# Patient Record
Sex: FEMALE | URBAN - METROPOLITAN AREA
[De-identification: names, ages, dates, MRNs, and addresses within clinical notes are randomized per-mention and may not be internally consistent; named-entity substitution may affect disease eponyms.]

---

## 2021-04-23 ENCOUNTER — OFFICE VISIT (OUTPATIENT)
Dept: URBAN - METROPOLITAN AREA CLINIC 26 | Facility: CLINIC | Age: 76
End: 2021-04-23
Payer: MEDICARE

## 2021-04-23 DIAGNOSIS — Z79.84 LONG TERM (CURRENT) USE OF ORAL HYPOGLYCEMIC DRUGS: ICD-10-CM

## 2021-04-23 DIAGNOSIS — Z96.1 PRESENCE OF PSEUDOPHAKIA: ICD-10-CM

## 2021-04-23 DIAGNOSIS — H04.123 TEAR FILM INSUFFICIENCY OF BILATERAL LACRIMAL GLANDS: ICD-10-CM

## 2021-04-23 DIAGNOSIS — E11.9 TYPE 2 DIABETES MELLITUS W/O COMPLICATION: Primary | ICD-10-CM

## 2021-04-23 DIAGNOSIS — H43.391 OTHER VITREOUS OPACITIES OF RIGHT EYE: ICD-10-CM

## 2021-04-23 PROCEDURE — 92004 COMPRE OPH EXAM NEW PT 1/>: CPT | Performed by: OPTOMETRIST

## 2021-04-23 PROCEDURE — 92134 CPTRZ OPH DX IMG PST SGM RTA: CPT | Performed by: OPTOMETRIST

## 2021-04-23 ASSESSMENT — VISUAL ACUITY
OS: 20/20
OD: 20/20

## 2021-04-23 ASSESSMENT — INTRAOCULAR PRESSURE
OD: 17
OS: 15

## 2021-04-23 ASSESSMENT — KERATOMETRY
OS: 43.38
OD: 43.75

## 2021-04-23 NOTE — IMPRESSION/PLAN
Impression: Type 2 diabetes mellitus w/o complication: E63.2. Plan: No Non-Proliferative Diabetic Retinopathy, no Diabetic Macular Edema and no Neovascularization of the iris, disc, or elsewhere. Discussed ocular and systemic benefits of blood sugar control. Check annually.

## 2022-08-10 ENCOUNTER — OFFICE VISIT (OUTPATIENT)
Dept: URBAN - METROPOLITAN AREA CLINIC 30 | Facility: CLINIC | Age: 77
End: 2022-08-10
Payer: MEDICARE

## 2022-08-10 DIAGNOSIS — Z79.84 LONG TERM (CURRENT) USE OF ORAL HYPOGLYCEMIC DRUGS: ICD-10-CM

## 2022-08-10 DIAGNOSIS — H04.123 TEAR FILM INSUFFICIENCY OF BILATERAL LACRIMAL GLANDS: ICD-10-CM

## 2022-08-10 DIAGNOSIS — E11.9 TYPE 2 DIABETES MELLITUS W/O COMPLICATION: Primary | ICD-10-CM

## 2022-08-10 DIAGNOSIS — H43.813 VITREOUS DEGENERATION, BILATERAL: ICD-10-CM

## 2022-08-10 DIAGNOSIS — Z96.1 PRESENCE OF PSEUDOPHAKIA: ICD-10-CM

## 2022-08-10 PROCEDURE — 92134 CPTRZ OPH DX IMG PST SGM RTA: CPT | Performed by: OPTOMETRIST

## 2022-08-10 PROCEDURE — 92014 COMPRE OPH EXAM EST PT 1/>: CPT | Performed by: OPTOMETRIST

## 2022-08-10 ASSESSMENT — KERATOMETRY
OD: 43.76
OS: 43.63

## 2022-08-10 ASSESSMENT — VISUAL ACUITY
OD: 20/20
OS: 20/20

## 2022-08-10 ASSESSMENT — INTRAOCULAR PRESSURE
OS: 15
OD: 15

## 2022-08-10 NOTE — IMPRESSION/PLAN
Impression: Type 2 diabetes mellitus w/o complication: S92.4. Plan: No retinopathy on exam today. Patient educated on importance of BG control. Monitor annually.

## 2022-08-10 NOTE — IMPRESSION/PLAN
Impression: Long term (current) use of oral hypoglycemic drugs: Z79.84.  Plan: see above, well controlled

## 2022-08-10 NOTE — IMPRESSION/PLAN
Impression: Tear film insufficiency of bilateral lacrimal glands: H04.123. Plan: Using Systane often BID OU. Rec increase AT use to QID, avoid fans (sleeps with fan on)-can use mask at night or try ivy. Can use humidifier, stay hydrated. Discussed how can impact vision, no change in spec rx. If NI in 2-3 months schedule dry eye f/u and refraction.

## 2023-02-16 ENCOUNTER — OFFICE VISIT (OUTPATIENT)
Dept: URBAN - METROPOLITAN AREA CLINIC 30 | Facility: CLINIC | Age: 78
End: 2023-02-16
Payer: COMMERCIAL

## 2023-02-16 DIAGNOSIS — H52.4 PRESBYOPIA: Primary | ICD-10-CM

## 2023-02-16 PROCEDURE — 92014 COMPRE OPH EXAM EST PT 1/>: CPT | Performed by: OPTOMETRIST

## 2023-02-16 ASSESSMENT — VISUAL ACUITY
OD: 20/25
OS: 20/25

## 2023-02-16 ASSESSMENT — KERATOMETRY
OS: 43.24
OD: 43.84

## 2023-02-16 ASSESSMENT — INTRAOCULAR PRESSURE
OD: 16
OS: 15

## 2023-02-16 NOTE — IMPRESSION/PLAN
Impression: Presbyopia: H52.4. Plan: Spec Rx issued x 2 today. Blot heme sup to macula OS on exam today. Pt notes recently elevated blood pressure and that she will be seeing her cardiologist tomorrow. Check at next scheduled visit.

## 2025-04-01 ENCOUNTER — APPOINTMENT (OUTPATIENT)
Dept: URBAN - METROPOLITAN AREA CLINIC 75 | Facility: CLINIC | Age: 80
Setting detail: DERMATOLOGY
End: 2025-04-01

## 2025-04-01 DIAGNOSIS — L57.0 ACTINIC KERATOSIS: ICD-10-CM

## 2025-04-01 DIAGNOSIS — L57.8 OTHER SKIN CHANGES DUE TO CHRONIC EXPOSURE TO NONIONIZING RADIATION: ICD-10-CM

## 2025-04-01 DIAGNOSIS — D485 NEOPLASM OF UNCERTAIN BEHAVIOR OF SKIN: ICD-10-CM

## 2025-04-01 DIAGNOSIS — Z85.828 PERSONAL HISTORY OF OTHER MALIGNANT NEOPLASM OF SKIN: ICD-10-CM

## 2025-04-01 PROBLEM — D48.5 NEOPLASM OF UNCERTAIN BEHAVIOR OF SKIN: Status: ACTIVE | Noted: 2025-04-01

## 2025-04-01 PROCEDURE — ? DEFER

## 2025-04-01 PROCEDURE — ? ADDITIONAL NOTES

## 2025-04-01 PROCEDURE — ? COUNSELING

## 2025-04-01 PROCEDURE — 99203 OFFICE O/P NEW LOW 30 MIN: CPT

## 2025-04-01 ASSESSMENT — LOCATION DETAILED DESCRIPTION DERM
LOCATION DETAILED: LEFT PROXIMAL DORSAL FOREARM
LOCATION DETAILED: RIGHT SUPERIOR VERMILION LIP
LOCATION DETAILED: LEFT ANTITRAGUS
LOCATION DETAILED: RIGHT CENTRAL ZYGOMA

## 2025-04-01 ASSESSMENT — LOCATION ZONE DERM
LOCATION ZONE: ARM
LOCATION ZONE: FACE
LOCATION ZONE: EAR
LOCATION ZONE: LIP

## 2025-04-01 ASSESSMENT — LOCATION SIMPLE DESCRIPTION DERM
LOCATION SIMPLE: LEFT EAR
LOCATION SIMPLE: RIGHT LIP
LOCATION SIMPLE: LEFT FOREARM
LOCATION SIMPLE: RIGHT ZYGOMA

## 2025-04-01 NOTE — HPI: SKIN LESION
What Type Of Note Output Would You Prefer (Optional)?: Standard Output
How Severe Is Your Skin Lesion?: mild
Has Your Skin Lesion Been Treated?: been treated
Is This A New Presentation, Or A Follow-Up?: Growths
Additional History: All spots don’t fully heal.. spot on temple pcp froze twice and it keeps coming back
Which Family Member (Optional)?: Mom, sister

## 2025-04-01 NOTE — PROCEDURE: ADDITIONAL NOTES
Detail Level: Simple
Render Risk Assessment In Note?: no
Additional Notes: A: Spot that is tender on the left ear x 5 months. It was frozen by PCP but it still persisted. Pearly papule with telangiectasia and central ulceration. BCC. 
Additional Notes: - AK on the right temple frozen twice by PCP 8 months ago. Scaly AK seen. LN2 after codes\\n*Spot on the lip has a growth that has been there for years just medial to this is an area that splits and bleeds when it gets dry. She uses moisturizer with HC in it. Small irritated fissure seen. LN2 to this area. Consider mupirocin?\\n*Spot on the left forearm scaley. LN2 after codes. Consider bx in future.

## 2025-04-01 NOTE — PROCEDURE: DEFER
Size Of Lesion In Cm (Optional): 0.6
Detail Level: Detailed
Procedure To Be Performed At Next Visit: Cryotherapy
Introduction Text (Please End With A Colon): The following procedure was deferred:\\n
X Size Of Lesion In Cm (Optional): 0
Procedure To Be Performed At Next Visit: Biopsy by shave method

## 2025-04-01 NOTE — PROCEDURE: MIPS QUALITY
Quality 226: Preventive Care And Screening: Tobacco Use: Screening And Cessation Intervention: Patient screened for tobacco use and is an ex/non-smoker
Quality 47: Advance Care Plan: Advance Care Planning discussed and documented; advance care plan or surrogate decision maker documented in the medical record.
Detail Level: Detailed
Quality 48: Urinary Incontinence: Assessment Of Presence Or Absence Of Urinary Incontinence In Women Aged 65 Years And Older: Absence of Urinary Incontinence Assessed
Quality 431: Preventive Care And Screening: Unhealthy Alcohol Use - Screening: Patient not identified as an unhealthy alcohol user when screened for unhealthy alcohol use using a systematic screening method
Quality 130: Documentation Of Current Medications In The Medical Record: Current Medications Documented